# Patient Record
Sex: MALE | Race: WHITE | NOT HISPANIC OR LATINO | URBAN - METROPOLITAN AREA
[De-identification: names, ages, dates, MRNs, and addresses within clinical notes are randomized per-mention and may not be internally consistent; named-entity substitution may affect disease eponyms.]

---

## 2018-08-30 ENCOUNTER — INPATIENT (INPATIENT)
Facility: HOSPITAL | Age: 63
LOS: 1 days | Discharge: HOME HEALTH | DRG: 489 | End: 2018-09-01
Attending: ORTHOPAEDIC SURGERY | Admitting: ORTHOPAEDIC SURGERY
Payer: COMMERCIAL

## 2018-08-30 VITALS
TEMPERATURE: 98 F | OXYGEN SATURATION: 98 % | DIASTOLIC BLOOD PRESSURE: 81 MMHG | RESPIRATION RATE: 18 BRPM | HEART RATE: 57 BPM | SYSTOLIC BLOOD PRESSURE: 131 MMHG

## 2018-08-30 LAB
ALBUMIN SERPL ELPH-MCNC: 4.6 G/DL — SIGNIFICANT CHANGE UP (ref 3.4–5)
ALP SERPL-CCNC: 88 U/L — SIGNIFICANT CHANGE UP (ref 40–120)
ALT FLD-CCNC: 171 U/L — HIGH (ref 12–42)
ANION GAP SERPL CALC-SCNC: 8 MMOL/L — LOW (ref 9–16)
APTT BLD: 27.8 SEC — SIGNIFICANT CHANGE UP (ref 27.5–36.5)
AST SERPL-CCNC: 71 U/L — HIGH (ref 15–37)
BILIRUB SERPL-MCNC: 1.2 MG/DL — SIGNIFICANT CHANGE UP (ref 0.2–1.2)
BUN SERPL-MCNC: 32 MG/DL — HIGH (ref 7–23)
CALCIUM SERPL-MCNC: 9.4 MG/DL — SIGNIFICANT CHANGE UP (ref 8.5–10.5)
CHLORIDE SERPL-SCNC: 102 MMOL/L — SIGNIFICANT CHANGE UP (ref 96–108)
CO2 SERPL-SCNC: 26 MMOL/L — SIGNIFICANT CHANGE UP (ref 22–31)
CREAT SERPL-MCNC: 1.18 MG/DL — SIGNIFICANT CHANGE UP (ref 0.5–1.3)
GLUCOSE SERPL-MCNC: 103 MG/DL — HIGH (ref 70–99)
HCT VFR BLD CALC: 44.7 % — SIGNIFICANT CHANGE UP (ref 39–50)
HGB BLD-MCNC: 15.5 G/DL — SIGNIFICANT CHANGE UP (ref 13–17)
INR BLD: 1.08 — SIGNIFICANT CHANGE UP (ref 0.88–1.16)
MCHC RBC-ENTMCNC: 30.5 PG — SIGNIFICANT CHANGE UP (ref 27–34)
MCHC RBC-ENTMCNC: 34.7 G/DL — SIGNIFICANT CHANGE UP (ref 32–36)
MCV RBC AUTO: 87.8 FL — SIGNIFICANT CHANGE UP (ref 80–100)
PLATELET # BLD AUTO: 227 K/UL — SIGNIFICANT CHANGE UP (ref 150–400)
POTASSIUM SERPL-MCNC: 4.7 MMOL/L — SIGNIFICANT CHANGE UP (ref 3.5–5.3)
POTASSIUM SERPL-SCNC: 4.7 MMOL/L — SIGNIFICANT CHANGE UP (ref 3.5–5.3)
PROT SERPL-MCNC: 8.2 G/DL — SIGNIFICANT CHANGE UP (ref 6.4–8.2)
PROTHROM AB SERPL-ACNC: 11.9 SEC — SIGNIFICANT CHANGE UP (ref 9.8–12.7)
RBC # BLD: 5.09 M/UL — SIGNIFICANT CHANGE UP (ref 4.2–5.8)
RBC # FLD: 12 % — SIGNIFICANT CHANGE UP (ref 10.3–16.9)
SODIUM SERPL-SCNC: 136 MMOL/L — SIGNIFICANT CHANGE UP (ref 132–145)
WBC # BLD: 11.5 K/UL — HIGH (ref 3.8–10.5)
WBC # FLD AUTO: 11.5 K/UL — HIGH (ref 3.8–10.5)

## 2018-08-30 PROCEDURE — 73552 X-RAY EXAM OF FEMUR 2/>: CPT | Mod: 26,LT

## 2018-08-30 PROCEDURE — 73564 X-RAY EXAM KNEE 4 OR MORE: CPT | Mod: 26,LT

## 2018-08-30 PROCEDURE — 93010 ELECTROCARDIOGRAM REPORT: CPT

## 2018-08-30 PROCEDURE — 99285 EMERGENCY DEPT VISIT HI MDM: CPT | Mod: 25

## 2018-08-30 PROCEDURE — 99222 1ST HOSP IP/OBS MODERATE 55: CPT | Mod: 57

## 2018-08-30 PROCEDURE — 71045 X-RAY EXAM CHEST 1 VIEW: CPT | Mod: 26

## 2018-08-30 RX ORDER — BACLOFEN 100 %
10 POWDER (GRAM) MISCELLANEOUS ONCE
Qty: 0 | Refills: 0 | Status: COMPLETED | OUTPATIENT
Start: 2018-08-30 | End: 2018-08-30

## 2018-08-30 RX ORDER — OXYCODONE AND ACETAMINOPHEN 5; 325 MG/1; MG/1
1 TABLET ORAL ONCE
Qty: 0 | Refills: 0 | Status: DISCONTINUED | OUTPATIENT
Start: 2018-08-30 | End: 2018-08-30

## 2018-08-30 RX ORDER — IBUPROFEN 200 MG
600 TABLET ORAL ONCE
Qty: 0 | Refills: 0 | Status: COMPLETED | OUTPATIENT
Start: 2018-08-30 | End: 2018-08-30

## 2018-08-30 RX ADMIN — Medication 10 MILLIGRAM(S): at 21:09

## 2018-08-30 RX ADMIN — OXYCODONE AND ACETAMINOPHEN 1 TABLET(S): 5; 325 TABLET ORAL at 21:59

## 2018-08-30 RX ADMIN — Medication 600 MILLIGRAM(S): at 21:09

## 2018-08-30 NOTE — ED PROVIDER NOTE - OBJECTIVE STATEMENT
PMHX erythromycin allergy, depression, chronic R hip pain on meloxicam, GERD, presents with acute onset of L thigh pain while walking down a set of steps. denies fall, trauma. patient slumped against a wall and to the floor and has not been able to bear weight. incident occurred at 6pm this evening. denies paresthesias or focal weakness

## 2018-08-30 NOTE — ED ADULT NURSE NOTE - CHPI ED NUR SYMPTOMS NEG
no abrasion/no deformity/no tingling/no confusion/no numbness/no vomiting/no bleeding/no loss of consciousness/no fever

## 2018-08-30 NOTE — ED PROVIDER NOTE - PHYSICAL EXAMINATION
L thigh TTP over the distal anterior thigh, unable to extend at the knee, able to move all toes, DP pulses equal

## 2018-08-30 NOTE — ED PROVIDER NOTE - ATTENDING CONTRIBUTION TO CARE
Pt is a 64yo M with acute onset of L thigh pain while walking down a set of steps. denies fall, trauma.  PE - agree with PA exam as above.  C/W quad tendon rupture.   Ortho consulted.   Will admit to Dr. Owen - seen here at [facility].

## 2018-08-30 NOTE — ED ADULT NURSE NOTE - NSIMPLEMENTINTERV_GEN_ALL_ED
Implemented All Fall Risk Interventions:  [place] to call system. Call bell, personal items and telephone within reach. Instruct patient to call for assistance. Room bathroom lighting operational. Non-slip footwear when patient is off stretcher. Physically safe environment: no spills, clutter or unnecessary equipment. Stretcher in lowest position, wheels locked, appropriate side rails in place. Provide visual cue, wrist band, yellow gown, etc. Monitor gait and stability. Monitor for mental status changes and reorient to person, place, and time. Review medications for side effects contributing to fall risk. Reinforce activity limits and safety measures with patient and family.

## 2018-08-30 NOTE — ED PROVIDER NOTE - MEDICAL DECISION MAKING DETAILS
patient with L quadraceps tendon rupture, give baclofen, percocet, ibuprofen for pain, unable to tolerate knee immobilizer or weight bear. will check xray and consult ortho

## 2018-08-30 NOTE — ED PROVIDER NOTE - DIAGNOSTIC INTERPRETATION
xray femur 2v L: no fx, no dislocation, no soft tissue injury  xray knee 3v L: no fx, calcifications proximal to patella, no dislocation

## 2018-08-30 NOTE — ED ADULT NURSE NOTE - OBJECTIVE STATEMENT
Pt presents to ED c/o left thigh pain s/p mechanical fall down steps while moving, denies any LOC or use of blood thinners. Pt unable to bear weight, unable to flex or extend at knee without pain. Denies any numbness or tingling to extremity, +bounding pulses and good cap refill. No bruising, swelling or deformities noted.

## 2018-08-31 ENCOUNTER — APPOINTMENT (OUTPATIENT)
Dept: ORTHOPEDIC SURGERY | Facility: HOSPITAL | Age: 63
End: 2018-08-31

## 2018-08-31 ENCOUNTER — TRANSCRIPTION ENCOUNTER (OUTPATIENT)
Age: 63
End: 2018-08-31

## 2018-08-31 DIAGNOSIS — Z98.890 OTHER SPECIFIED POSTPROCEDURAL STATES: Chronic | ICD-10-CM

## 2018-08-31 PROBLEM — Z00.00 ENCOUNTER FOR PREVENTIVE HEALTH EXAMINATION: Status: ACTIVE | Noted: 2018-08-31

## 2018-08-31 LAB
ANION GAP SERPL CALC-SCNC: 12 MMOL/L — SIGNIFICANT CHANGE UP (ref 5–17)
APPEARANCE UR: CLEAR — SIGNIFICANT CHANGE UP
BASOPHILS NFR BLD AUTO: 0.4 % — SIGNIFICANT CHANGE UP (ref 0–2)
BILIRUB UR-MCNC: NEGATIVE — SIGNIFICANT CHANGE UP
BLD GP AB SCN SERPL QL: NEGATIVE — SIGNIFICANT CHANGE UP
BUN SERPL-MCNC: 28 MG/DL — HIGH (ref 7–23)
CALCIUM SERPL-MCNC: 9.6 MG/DL — SIGNIFICANT CHANGE UP (ref 8.4–10.5)
CHLORIDE SERPL-SCNC: 101 MMOL/L — SIGNIFICANT CHANGE UP (ref 96–108)
CO2 SERPL-SCNC: 26 MMOL/L — SIGNIFICANT CHANGE UP (ref 22–31)
COLOR SPEC: YELLOW — SIGNIFICANT CHANGE UP
CREAT SERPL-MCNC: 1.19 MG/DL — SIGNIFICANT CHANGE UP (ref 0.5–1.3)
DIFF PNL FLD: NEGATIVE — SIGNIFICANT CHANGE UP
EOSINOPHIL NFR BLD AUTO: 2.4 % — SIGNIFICANT CHANGE UP (ref 0–6)
GLUCOSE SERPL-MCNC: 103 MG/DL — HIGH (ref 70–99)
GLUCOSE UR QL: NEGATIVE — SIGNIFICANT CHANGE UP
HCT VFR BLD CALC: 43.4 % — SIGNIFICANT CHANGE UP (ref 39–50)
HGB BLD-MCNC: 14.7 G/DL — SIGNIFICANT CHANGE UP (ref 13–17)
KETONES UR-MCNC: ABNORMAL MG/DL
LEUKOCYTE ESTERASE UR-ACNC: NEGATIVE — SIGNIFICANT CHANGE UP
LYMPHOCYTES # BLD AUTO: 33.5 % — SIGNIFICANT CHANGE UP (ref 13–44)
MCHC RBC-ENTMCNC: 30 PG — SIGNIFICANT CHANGE UP (ref 27–34)
MCHC RBC-ENTMCNC: 33.9 G/DL — SIGNIFICANT CHANGE UP (ref 32–36)
MCV RBC AUTO: 88.6 FL — SIGNIFICANT CHANGE UP (ref 80–100)
MONOCYTES NFR BLD AUTO: 8.9 % — SIGNIFICANT CHANGE UP (ref 2–14)
NEUTROPHILS NFR BLD AUTO: 54.8 % — SIGNIFICANT CHANGE UP (ref 43–77)
NITRITE UR-MCNC: NEGATIVE — SIGNIFICANT CHANGE UP
PH UR: 6 — SIGNIFICANT CHANGE UP (ref 5–8)
PLATELET # BLD AUTO: 188 K/UL — SIGNIFICANT CHANGE UP (ref 150–400)
POTASSIUM SERPL-MCNC: 4.4 MMOL/L — SIGNIFICANT CHANGE UP (ref 3.5–5.3)
POTASSIUM SERPL-SCNC: 4.4 MMOL/L — SIGNIFICANT CHANGE UP (ref 3.5–5.3)
PROT UR-MCNC: NEGATIVE MG/DL — SIGNIFICANT CHANGE UP
RBC # BLD: 4.9 M/UL — SIGNIFICANT CHANGE UP (ref 4.2–5.8)
RBC # FLD: 12.7 % — SIGNIFICANT CHANGE UP (ref 10.3–16.9)
RH IG SCN BLD-IMP: POSITIVE — SIGNIFICANT CHANGE UP
SODIUM SERPL-SCNC: 139 MMOL/L — SIGNIFICANT CHANGE UP (ref 135–145)
SP GR SPEC: 1.02 — SIGNIFICANT CHANGE UP (ref 1–1.03)
UROBILINOGEN FLD QL: 0.2 E.U./DL — SIGNIFICANT CHANGE UP
WBC # BLD: 7.9 K/UL — SIGNIFICANT CHANGE UP (ref 3.8–10.5)
WBC # FLD AUTO: 7.9 K/UL — SIGNIFICANT CHANGE UP (ref 3.8–10.5)

## 2018-08-31 PROCEDURE — 27385 REPAIR OF THIGH MUSCLE: CPT | Mod: LT

## 2018-08-31 PROCEDURE — 99223 1ST HOSP IP/OBS HIGH 75: CPT

## 2018-08-31 RX ORDER — SODIUM CHLORIDE 9 MG/ML
1000 INJECTION, SOLUTION INTRAVENOUS
Qty: 0 | Refills: 0 | Status: DISCONTINUED | OUTPATIENT
Start: 2018-08-31 | End: 2018-09-01

## 2018-08-31 RX ORDER — HYDROMORPHONE HYDROCHLORIDE 2 MG/ML
0.5 INJECTION INTRAMUSCULAR; INTRAVENOUS; SUBCUTANEOUS
Qty: 0 | Refills: 0 | Status: DISCONTINUED | OUTPATIENT
Start: 2018-08-31 | End: 2018-09-01

## 2018-08-31 RX ORDER — MORPHINE SULFATE 50 MG/1
2 CAPSULE, EXTENDED RELEASE ORAL EVERY 4 HOURS
Qty: 0 | Refills: 0 | Status: DISCONTINUED | OUTPATIENT
Start: 2018-08-31 | End: 2018-08-31

## 2018-08-31 RX ORDER — SODIUM CHLORIDE 9 MG/ML
1000 INJECTION, SOLUTION INTRAVENOUS
Qty: 0 | Refills: 0 | Status: DISCONTINUED | OUTPATIENT
Start: 2018-08-31 | End: 2018-08-31

## 2018-08-31 RX ORDER — ASPIRIN/CALCIUM CARB/MAGNESIUM 324 MG
325 TABLET ORAL
Qty: 0 | Refills: 0 | Status: DISCONTINUED | OUTPATIENT
Start: 2018-08-31 | End: 2018-09-01

## 2018-08-31 RX ORDER — CEFAZOLIN SODIUM 1 G
2000 VIAL (EA) INJECTION EVERY 8 HOURS
Qty: 0 | Refills: 0 | Status: COMPLETED | OUTPATIENT
Start: 2018-08-31 | End: 2018-08-31

## 2018-08-31 RX ORDER — DOCUSATE SODIUM 100 MG
1 CAPSULE ORAL
Qty: 10 | Refills: 0 | OUTPATIENT
Start: 2018-08-31 | End: 2018-09-09

## 2018-08-31 RX ORDER — ONDANSETRON 8 MG/1
4 TABLET, FILM COATED ORAL EVERY 8 HOURS
Qty: 0 | Refills: 0 | Status: DISCONTINUED | OUTPATIENT
Start: 2018-08-31 | End: 2018-08-31

## 2018-08-31 RX ORDER — BUPROPION HYDROCHLORIDE 150 MG/1
150 TABLET, EXTENDED RELEASE ORAL DAILY
Qty: 0 | Refills: 0 | Status: DISCONTINUED | OUTPATIENT
Start: 2018-08-31 | End: 2018-09-01

## 2018-08-31 RX ORDER — ASPIRIN/CALCIUM CARB/MAGNESIUM 324 MG
1 TABLET ORAL
Qty: 60 | Refills: 0 | OUTPATIENT
Start: 2018-08-31 | End: 2018-09-29

## 2018-08-31 RX ORDER — ACETAMINOPHEN 500 MG
1000 TABLET ORAL ONCE
Qty: 0 | Refills: 0 | Status: COMPLETED | OUTPATIENT
Start: 2018-08-31 | End: 2018-08-31

## 2018-08-31 RX ORDER — ONDANSETRON 8 MG/1
4 TABLET, FILM COATED ORAL EVERY 4 HOURS
Qty: 0 | Refills: 0 | Status: DISCONTINUED | OUTPATIENT
Start: 2018-08-31 | End: 2018-09-01

## 2018-08-31 RX ORDER — OXYCODONE HYDROCHLORIDE 5 MG/1
5 TABLET ORAL EVERY 4 HOURS
Qty: 0 | Refills: 0 | Status: DISCONTINUED | OUTPATIENT
Start: 2018-08-31 | End: 2018-09-01

## 2018-08-31 RX ORDER — HYDROMORPHONE HYDROCHLORIDE 2 MG/ML
0.5 INJECTION INTRAMUSCULAR; INTRAVENOUS; SUBCUTANEOUS ONCE
Qty: 0 | Refills: 0 | Status: DISCONTINUED | OUTPATIENT
Start: 2018-08-31 | End: 2018-08-31

## 2018-08-31 RX ORDER — SERTRALINE 25 MG/1
50 TABLET, FILM COATED ORAL DAILY
Qty: 0 | Refills: 0 | Status: DISCONTINUED | OUTPATIENT
Start: 2018-08-31 | End: 2018-09-01

## 2018-08-31 RX ORDER — DOCUSATE SODIUM 100 MG
100 CAPSULE ORAL THREE TIMES A DAY
Qty: 0 | Refills: 0 | Status: DISCONTINUED | OUTPATIENT
Start: 2018-08-31 | End: 2018-09-01

## 2018-08-31 RX ORDER — CEFAZOLIN SODIUM 1 G
2000 VIAL (EA) INJECTION EVERY 8 HOURS
Qty: 0 | Refills: 0 | Status: DISCONTINUED | OUTPATIENT
Start: 2018-08-31 | End: 2018-08-31

## 2018-08-31 RX ORDER — OXYCODONE HYDROCHLORIDE 5 MG/1
10 TABLET ORAL EVERY 4 HOURS
Qty: 0 | Refills: 0 | Status: DISCONTINUED | OUTPATIENT
Start: 2018-08-31 | End: 2018-09-01

## 2018-08-31 RX ADMIN — Medication 100 MILLIGRAM(S): at 16:09

## 2018-08-31 RX ADMIN — HYDROMORPHONE HYDROCHLORIDE 0.5 MILLIGRAM(S): 2 INJECTION INTRAMUSCULAR; INTRAVENOUS; SUBCUTANEOUS at 14:15

## 2018-08-31 RX ADMIN — HYDROMORPHONE HYDROCHLORIDE 0.5 MILLIGRAM(S): 2 INJECTION INTRAMUSCULAR; INTRAVENOUS; SUBCUTANEOUS at 18:25

## 2018-08-31 RX ADMIN — OXYCODONE HYDROCHLORIDE 10 MILLIGRAM(S): 5 TABLET ORAL at 23:12

## 2018-08-31 RX ADMIN — HYDROMORPHONE HYDROCHLORIDE 0.5 MILLIGRAM(S): 2 INJECTION INTRAMUSCULAR; INTRAVENOUS; SUBCUTANEOUS at 14:00

## 2018-08-31 RX ADMIN — Medication 2000 MILLIGRAM(S): at 16:09

## 2018-08-31 RX ADMIN — OXYCODONE HYDROCHLORIDE 10 MILLIGRAM(S): 5 TABLET ORAL at 14:15

## 2018-08-31 RX ADMIN — OXYCODONE HYDROCHLORIDE 5 MILLIGRAM(S): 5 TABLET ORAL at 20:35

## 2018-08-31 RX ADMIN — HYDROMORPHONE HYDROCHLORIDE 0.5 MILLIGRAM(S): 2 INJECTION INTRAMUSCULAR; INTRAVENOUS; SUBCUTANEOUS at 22:25

## 2018-08-31 RX ADMIN — Medication 2000 MILLIGRAM(S): at 23:15

## 2018-08-31 RX ADMIN — Medication 100 MILLIGRAM(S): at 05:36

## 2018-08-31 RX ADMIN — ONDANSETRON 4 MILLIGRAM(S): 8 TABLET, FILM COATED ORAL at 17:27

## 2018-08-31 RX ADMIN — HYDROMORPHONE HYDROCHLORIDE 0.5 MILLIGRAM(S): 2 INJECTION INTRAMUSCULAR; INTRAVENOUS; SUBCUTANEOUS at 14:35

## 2018-08-31 RX ADMIN — OXYCODONE HYDROCHLORIDE 5 MILLIGRAM(S): 5 TABLET ORAL at 19:36

## 2018-08-31 RX ADMIN — Medication 100 MILLIGRAM(S): at 22:08

## 2018-08-31 RX ADMIN — Medication 400 MILLIGRAM(S): at 23:45

## 2018-08-31 RX ADMIN — MORPHINE SULFATE 2 MILLIGRAM(S): 50 CAPSULE, EXTENDED RELEASE ORAL at 15:00

## 2018-08-31 RX ADMIN — HYDROMORPHONE HYDROCHLORIDE 0.5 MILLIGRAM(S): 2 INJECTION INTRAMUSCULAR; INTRAVENOUS; SUBCUTANEOUS at 14:45

## 2018-08-31 RX ADMIN — SODIUM CHLORIDE 80 MILLILITER(S): 9 INJECTION, SOLUTION INTRAVENOUS at 01:44

## 2018-08-31 RX ADMIN — HYDROMORPHONE HYDROCHLORIDE 0.5 MILLIGRAM(S): 2 INJECTION INTRAMUSCULAR; INTRAVENOUS; SUBCUTANEOUS at 22:10

## 2018-08-31 RX ADMIN — OXYCODONE HYDROCHLORIDE 10 MILLIGRAM(S): 5 TABLET ORAL at 23:45

## 2018-08-31 RX ADMIN — SERTRALINE 50 MILLIGRAM(S): 25 TABLET, FILM COATED ORAL at 05:36

## 2018-08-31 RX ADMIN — ONDANSETRON 4 MILLIGRAM(S): 8 TABLET, FILM COATED ORAL at 23:20

## 2018-08-31 RX ADMIN — HYDROMORPHONE HYDROCHLORIDE 0.5 MILLIGRAM(S): 2 INJECTION INTRAMUSCULAR; INTRAVENOUS; SUBCUTANEOUS at 14:20

## 2018-08-31 RX ADMIN — HYDROMORPHONE HYDROCHLORIDE 0.5 MILLIGRAM(S): 2 INJECTION INTRAMUSCULAR; INTRAVENOUS; SUBCUTANEOUS at 17:27

## 2018-08-31 RX ADMIN — MORPHINE SULFATE 2 MILLIGRAM(S): 50 CAPSULE, EXTENDED RELEASE ORAL at 16:00

## 2018-08-31 RX ADMIN — BUPROPION HYDROCHLORIDE 150 MILLIGRAM(S): 150 TABLET, EXTENDED RELEASE ORAL at 05:36

## 2018-08-31 NOTE — PHYSICAL THERAPY INITIAL EVALUATION ADULT - PLANNED THERAPY INTERVENTIONS, PT EVAL
bed mobility training/gait training/balance training/transfer training/postural re-education/strengthening/ROM

## 2018-08-31 NOTE — DISCHARGE NOTE ADULT - MEDICATION SUMMARY - MEDICATIONS TO TAKE
I will START or STAY ON the medications listed below when I get home from the hospital:    aspirin 325 mg oral tablet  -- 1 tab(s) by mouth 2 times a day   -- Take with food or milk.    -- Indication: For Tendon rupture, nontraumatic    oxyCODONE-acetaminophen 5 mg-325 mg oral tablet  -- 1 tab(s) by mouth 4 times a day, As Needed for Severe Pain MDD:4  -- Caution federal law prohibits the transfer of this drug to any person other  than the person for whom it was prescribed.  May cause drowsiness.  Alcohol may intensify this effect.  Use care when operating dangerous machinery.  This prescription cannot be refilled.  This product contains acetaminophen.  Do not use  with any other product containing acetaminophen to prevent possible liver damage.  Using more of this medication than prescribed may cause serious breathing problems.    -- Indication: For Tendon rupture, nontraumatic    sertraline 50 mg oral tablet  -- 1 tab(s) by mouth once a day  -- Indication: For Home med    famotidine 10 mg oral tablet  -- 10 milligram(s) by mouth 2 times a day  -- Indication: For Home med    Colace 100 mg oral capsule  -- 1 cap(s) by mouth once a day, As Needed for constipation  -- Medication should be taken with plenty of water.    -- Indication: For Tendon rupture, nontraumatic    Wellbutrin  mg/24 hours oral tablet, extended release  -- 1 tab(s) by mouth every 24 hours  -- Indication: For Home med

## 2018-08-31 NOTE — H&P ADULT - HISTORY OF PRESENT ILLNESS
Pt Name: LEON WEIL  MRN: [id number]    63M [facility] depression presents with L quad tendon rupture s/p mechanical fall. Patient was carrying furniture down stairs and fell on his left knee, complained of cramping afterwards and was unable to bear weight. Presented to [facility] and was evaluated by Dr. Owen and placed in a knee immobilizer, transferred to [facility]. Denies any other trauma, LOC, head strike, numbness/tingling.    ROS is otherwise negative.  PAST MEDICAL & SURGICAL HISTORY:  No pertinent past medical history  H/O inguinal hernia repair  H/O arthroscopy of right knee      Allergies: NKDA    Medications:  buPROPion XL . 150 milliGRAM(s) Oral daily  docusate sodium 100 milliGRAM(s) Oral three times a day  lactated ringers. 1000 milliLiter(s) IV Continuous <Continuous>  morphine  - Injectable 2 milliGRAM(s) IV Push every 4 hours PRN  oxyCODONE    IR 10 milliGRAM(s) Oral every 4 hours PRN  oxyCODONE    IR 5 milliGRAM(s) Oral every 4 hours PRN  sertraline 50 milliGRAM(s) Oral daily      Social History:  Ambulation: Walking independently    PHYSICAL EXAM:    T(C): 36.7 (08-31-18 @ 04:30), Max: 36.7 (08-30-18 @ 19:13)  HR: 50 (08-31-18 @ 04:30) (50 - 57)  BP: 153/83 (08-31-18 @ 04:30) (131/81 - 153/83)  RR: 16 (08-31-18 @ 04:30) (14 - 18)  SpO2: 99% (08-31-18 @ 04:30) (97% - 99%)  Wt(kg): --    Gen: well developed, well nourished, comfortable  Affected extremity: LLE  in knee immobilizer  no open skin breaks, lacerations, open wounds       Motor: firing GS/TA/EHL      Sensation: SILT sp/dp/camejo/saph/t  unable to straight leg raise      wwp <2 DP 2+     Labs:                        15.5   11.5  )-----------( 227      ( 30 Aug 2018 22:50 )             44.7     08-30    136  |  102  |  32<H>  ----------------------------<  103<H>  4.7   |  26  |  1.18    Ca    9.4      30 Aug 2018 22:50    TPro  8.2  /  Alb  4.6  /  TBili  1.2  /  DBili  x   /  AST  71<H>  /  ALT  171<H>  /  AlkPhos  88  08-30      A/P  Pt is a 64yo Male with L quad tendon rupture s/p mechanical fall  NPO  IVF  Pain control  Hold anticoagulation for OR  Labs  UA  type and screen  CXR   EKG  Medical Clearance with:   CLAY nasal swab  Consented for L quad tendon repair  d/w attending on call Dr. Owen Pt Name: LEON WEIL  MRN: [id number]    63M [facility] depression presents with L quad tendon rupture s/p mechanical fall. Patient was carrying furniture down stairs and fell on his left knee, complained of cramping afterwards and was unable to bear weight. Presented to [facility] and was evaluated by Dr. Owen and placed in a knee immobilizer, transferred to [facility]. Denies any other trauma, LOC, head strike, numbness/tingling. Patient was unable to clear discharge from Emergency room with crutches due to ambulatory dysfunction and fall risk. He was unsafe to be released home and therefore was admitted for operative stabilization and PT evaluation    ROS is otherwise negative.  PAST MEDICAL & SURGICAL HISTORY: Right knee meniscectomy x2, H/O inguinal hernia repair  Past medical history: Depression, GERD, HTN  Fam Hx: Denies any significant cardiopulmonary disease  Allergies: NKDA    Medications:  buPROPion XL . 150 milliGRAM(s) Oral daily  docusate sodium 100 milliGRAM(s) Oral three times a day  lactated ringers. 1000 milliLiter(s) IV Continuous <Continuous>  morphine  - Injectable 2 milliGRAM(s) IV Push every 4 hours PRN  oxyCODONE    IR 10 milliGRAM(s) Oral every 4 hours PRN  oxyCODONE    IR 5 milliGRAM(s) Oral every 4 hours PRN  sertraline 50 milliGRAM(s) Oral daily      Social History:  Ambulation: Walking independently, non smoker, occ etoh, denies illicits    PHYSICAL EXAM:    T(C): 36.7 (08-31-18 @ 04:30), Max: 36.7 (08-30-18 @ 19:13)  HR: 50 (08-31-18 @ 04:30) (50 - 57)  BP: 153/83 (08-31-18 @ 04:30) (131/81 - 153/83)  RR: 16 (08-31-18 @ 04:30) (14 - 18)  SpO2: 99% (08-31-18 @ 04:30) (97% - 99%)  Wt(kg): --    Gen: well developed, well nourished, comfortable  Affected extremity: LLE  in knee immobilizer  no open skin breaks, lacerations, open wounds       Motor: firing GS/TA/EHL      Sensation: SILT sp/dp/camejo/saph/t  unable to straight leg raise      wwp <2 DP 2+     Labs:                        15.5   11.5  )-----------( 227      ( 30 Aug 2018 22:50 )             44.7     08-30    136  |  102  |  32<H>  ----------------------------<  103<H>  4.7   |  26  |  1.18    Ca    9.4      30 Aug 2018 22:50    TPro  8.2  /  Alb  4.6  /  TBili  1.2  /  DBili  x   /  AST  71<H>  /  ALT  171<H>  /  AlkPhos  88  08-30      A/P  Pt is a 62yo Male with L quad tendon rupture s/p mechanical fall. Unable to be discharged for outpatient surgery due to fall risk.   NPO  IVF  Pain control  Hold anticoagulation for OR  Labs  UA  type and screen  CXR   EKG  Medical Clearance pending  MSRA nasal swab  Consented for L quad tendon repair  d/w attending on call Dr. Owen

## 2018-08-31 NOTE — DISCHARGE NOTE ADULT - CARE PROVIDERS DIRECT ADDRESSES
[email or web address]

## 2018-08-31 NOTE — DISCHARGE NOTE ADULT - ADDITIONAL INSTRUCTIONS
No strenuous activity, heavy lifting, driving, tub bathing, or returning to work until cleared by MD.  You may shower--dressing is waterproof.  Remove dressing after post op day 5, then leave incision open to air.  Follow up with Dr. Owen in his office in 2 weeks from surgery.  Any staples/sutures will be removed in 2 weeks.  If you don't have a bowel movement by post op day 3, then take Milk of Magnesia (over the counter).  If no bowel movement by at least post op day 5, then use a Dulcolax suppository (over the counter) and/or a Fleets enema--if still no bowel movement, call your MD.  Contact your doctor if you experience: fever greater than 101.5, chills, chest pain, difficulty breathing, bleeding, redness or heat around the incision.    Please follow up with your primary care provider. No strenuous activity, heavy lifting, driving, tub bathing, or returning to work until cleared by MD.  You may shower, no soaking  Remove dressing after post op day 7, then leave incision open to air.  You are weight bearing as tolerated of the left lower extremity with your hinged knee brace locked in extension.  No left knee flexion.    Follow up with Dr. Owen in his office in 2 weeks from surgery.  Any staples/sutures will be removed in 2 weeks.  If you don't have a bowel movement by post op day 3, then take Milk of Magnesia (over the counter).  If no bowel movement by at least post op day 5, then use a Dulcolax suppository (over the counter) and/or a Fleets enema--if still no bowel movement, call your MD.  Contact your doctor if you experience: fever greater than 101.5, chills, chest pain, difficulty breathing, bleeding, redness or heat around the incision.    Please follow up with your primary care provider.

## 2018-08-31 NOTE — PHYSICAL THERAPY INITIAL EVALUATION ADULT - ADDITIONAL COMMENTS
Patient lives in an private house with his wife and children in [place] with 2 steps with handrails to enter and another flight of 10 steps to reach second floor where bedrooms are. Prior to admission, patient was independent for all functional mobility without assistive device.

## 2018-08-31 NOTE — DISCHARGE NOTE ADULT - PATIENT PORTAL LINK FT
You can access the [facility] Patient Portal, offered by [facility], by registering with the following website: [email or web address]

## 2018-08-31 NOTE — PROVIDER CONTACT NOTE (OTHER) - ACTION/TREATMENT ORDERED:
Said MD instructed to administer Oxicodone PO early. He will reportedly order Tylenol IV as well and increase Zofran PRN frequency.

## 2018-08-31 NOTE — CONSULT NOTE ADULT - SUBJECTIVE AND OBJECTIVE BOX
INTERNAL MEDICINE SERVICE INITIAL CONSULT NOTE    HPI:  64yo M with PMHx of Depression, OA of R hip, GERD, MAGALYS (uses CPAP) presenting to the ED     presents with acute onset of L thigh pain while walking down a set of steps. denies fall, trauma. patient slumped against a wall and to the floor and has not been able to bear weight. incident occurred at 6pm this evening. denies paresthesias or focal weakness    Patient was helping his daughter move today, patient fell from 4-6 steps and fell on the side       > 4 METS  but less than 10    Denies rxn to anesthesia.      REVIEW OF SYSTEMS:    CONSTITUTIONAL: No weakness, fevers or chills  EYES/ENT: No visual changes;  No vertigo or throat pain   NECK: No pain or stiffness  RESPIRATORY: +HOFF walking up 4 flight of stairs, No cough, wheezing, hemoptysis; No shortness of breath  CARDIOVASCULAR: No chest pain or palpitations  GASTROINTESTINAL: No abdominal or epigastric pain. No nausea, vomiting, or hematemesis; No diarrhea or constipation. No melena or hematochezia.  GENITOURINARY: No dysuria, frequency or hematuria  NEUROLOGICAL: No numbness or weakness  SKIN: No itching, rashes    ADDITIONAL MEDICINE HPI:    REVIEW OF SYSTEMS:   Otherwise negative except as specified in HPI    PAST MEDICAL HISTORY:   Depression  OA of R hip  GERD  MAGALYS (uses CPAP)    PAST SURGICAL HISTORY:  Meniscal repar x2 of R knee   Abdominal Hernia repair  Inguinal hernia repair     FAMILY HISTORY:  FHx of CAD - father needed a bypass and  of MI in the 80s   FHx of Lung cancer - mother and  at 81     SOCIAL HISTORY:  Tobacco use: Never smoker   EtOH use: 1-2 beers/nightly   Illicit drug use: No drug use   Works as  for people with special needs, 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 1000 milliLiter(s) (80 mL/Hr) IV Continuous <Continuous>    MEDICATIONS  (PRN):  morphine  - Injectable 2 milliGRAM(s) IV Push every 4 hours PRN breakthrough pain  oxyCODONE    IR 10 milliGRAM(s) Oral every 4 hours PRN Severe Pain (7 - 10)  oxyCODONE    IR 5 milliGRAM(s) Oral every 4 hours PRN Moderate Pain (4 - 6)      ALLERGIES:  Allergies  erythromycin (Hives)    Intolerances    VITAL SIGNS:  Vital Signs Last 24 Hrs  T(C): 36.7 (31 Aug 2018 01:04), Max: 36.7 (30 Aug 2018 19:13)  T(F): 98 (31 Aug 2018 01:04), Max: 98 (30 Aug 2018 19:13)  HR: 52 (31 Aug 2018 01:04) (52 - 57)  BP: 132/72 (31 Aug 2018 01:04) (131/81 - 132/72)  BP(mean): --  RR: 14 (31 Aug 2018 01:) (14 - 18)  SpO2: 97% (31 Aug 2018 01:) (97% - 98%)      PHYSICAL EXAM:  Constitutional: WDWN resting comfortably in bed; NAD  Head: NC/AT  Eyes: PERRL, EOMI, anicteric sclera  ENT: no nasal discharge; uvula midline, no oropharyngeal erythema or exudates; MMM  Neck: supple; no JVD or thyromegaly  Respiratory: CTA B/L; no W/R/R, no retractions  Cardiac: +S1/S2; RRR; no M/R/G; PMI non-displaced  Gastrointestinal: abdomen soft, NT/ND; no rebound or guarding; +BSx4  Genitourinary: normal external genitalia  Back: spine midline, no bony tenderness or step-offs; no CVAT B/L  Extremities: WWP, no clubbing or cyanosis; no peripheral edema  Musculoskeletal: NROM x4; no joint swelling, tenderness or erythema  Vascular: 2+ radial, femoral, DP/PT pulses B/L  Dermatologic: skin warm, dry and intact; no rashes, wounds, or scars  Lymphatic: no submandibular or cervical LAD  Neurologic: AAOx3; CNII-XII grossly intact; no focal deficits  Psychiatric: affect and characteristics of appearance, verbalizations, behaviors are appropriate    LABS:                        15.5   11.5  )-----------( 227      ( 30 Aug 2018 22:50 )             44.7     08-30    136  |  102  |  32<H>  ----------------------------<  103<H>  4.7   |  26  |  1.18    Ca    9.4      30 Aug 2018 22:50    TPro  8.2  /  Alb  4.6  /  TBili  1.2  /  DBili  x   /  AST  71<H>  /  ALT  171<H>  /  AlkPhos  88  08-30    PT/INR - ( 30 Aug 2018 22:50 )   PT: 11.9 sec;   INR: 1.08          PTT - ( 30 Aug 2018 22:50 )  PTT:27.8 sec      RADIOLOGY & ADDITIONAL TESTS: INTERNAL MEDICINE SERVICE INITIAL CONSULT NOTE    HPI:  64yo M with PMHx of Depression, OA of R hip, GERD, MAGALYS (uses CPAP) presenting to the ED s/p mechanical fall. Patient was helping his daughter move today, then fell from 4-6 steps. He fell on the side with no LOC. Describes acute left thigh pain and was unable to bear weight on on his left leg and unable to flex of extend his knee without significant pain. Denies any numbness or tingling down either extremities. Patient states that over the past few weeks he has been moving into a new home, helping his daughter move yesterday, with decreased sleep. Patient has > 4 METS but less than 10 - Patient is a  and participates in moderate activity - doubles tennis and able to walk several blocks without any shortness of breath, chest pain, palpitations. Does complain of mild HOFF walking up 4 flight of stairs while lifting objects.     Patient had several surgeries prior and states never had rxn to anesthesia.  Patient denies any family hx of reaction to anesthesia.       REVIEW OF SYSTEMS:  CONSTITUTIONAL: No weakness, fevers or chills  EYES/ENT: No visual changes;  No vertigo or throat pain   NECK: No pain or stiffness  RESPIRATORY: +HOFF walking up 4 flight of stairs, No cough, wheezing, hemoptysis; No shortness of breath  CARDIOVASCULAR: No chest pain or palpitations  GASTROINTESTINAL: No abdominal or epigastric pain. No nausea, vomiting, or hematemesis; No diarrhea or constipation. No melena or hematochezia.  GENITOURINARY: No dysuria, frequency or hematuria  NEUROLOGICAL: No numbness or weakness  SKIN: No itching, rashes    ADDITIONAL MEDICINE HPI:    REVIEW OF SYSTEMS:   Otherwise negative except as specified in HPI    PAST MEDICAL HISTORY:   Depression  OA of R hip  GERD  MAGALYS (uses CPAP)    PAST SURGICAL HISTORY:  Meniscal repar x2 of R knee   Abdominal Hernia repair  Inguinal hernia repair     FAMILY HISTORY:  FHx of CAD - father needed a bypass and  of MI in the 80s   FHx of Lung cancer - mother and  at 81     SOCIAL HISTORY:  Tobacco use: Never smoker   EtOH use: 1-2 beers/nightly   Illicit drug use: No drug use   Works as  for people with special needs, 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 1000 milliLiter(s) (80 mL/Hr) IV Continuous <Continuous>    MEDICATIONS  (PRN):  morphine  - Injectable 2 milliGRAM(s) IV Push every 4 hours PRN breakthrough pain  oxyCODONE    IR 10 milliGRAM(s) Oral every 4 hours PRN Severe Pain (7 - 10)  oxyCODONE    IR 5 milliGRAM(s) Oral every 4 hours PRN Moderate Pain (4 - 6)      ALLERGIES:  Allergies  erythromycin (Hives)    Intolerances    VITAL SIGNS:  Vital Signs Last 24 Hrs  T(C): 36.7 (31 Aug 2018 01:04), Max: 36.7 (30 Aug 2018 19:13)  T(F): 98 (31 Aug 2018 01:04), Max: 98 (30 Aug 2018 19:13)  HR: 52 (31 Aug 2018 01:04) (52 - 57)  BP: 132/72 (31 Aug 2018 01:04) (131/81 - 132/72)  BP(mean): --  RR: 14 (31 Aug 2018 01:04) (14 - 18)  SpO2: 97% (31 Aug 2018 01:04) (97% - 98%)      PHYSICAL EXAM:  Constitutional: WDWN resting comfortably in bed; NAD  Head: NC/AT  Eyes: PERRL, EOMI, mildly erythematous R sclera   ENT: no nasal discharge; uvula midline, no oropharyngeal erythema or exudates; MMM  Neck: supple; no JVD or thyromegaly  Respiratory: CTA B/L; no W/R/R, no retractions  Cardiac: +S1/S2; RRR; no murmurs   Gastrointestinal: abdomen soft, mildly distended, nontender, no rebound or guarding; hyperactive BS  Back: spine midline, no bony tenderness or step-offs; no CVAT B/L  Extremities: L lower extremity in immobilizer, sensation intact, unable to assess strength, R lower extremity 5/5 strength, sensation intact, WWP, no clubbing or cyanosis; no peripheral edema  Musculoskeletal: NROM x4; no joint swelling, tenderness or erythema  Vascular: 2+ radial, DP/PT pulses B/L  Dermatologic: skin warm, dry and intact; no rashes, wounds, or scars  Lymphatic: no submandibular or cervical LAD  Neurologic: AAOx3; CNII-XII grossly intact; no focal deficits  Psychiatric: affect and characteristics of appearance, verbalizations, behaviors are appropriate    LABS:                        15.5   11.5  )-----------( 227      ( 30 Aug 2018 22:50 )             44.7         136  |  102  |  32<H>  ----------------------------<  103<H>  4.7   |  26  |  1.18    Ca    9.4      30 Aug 2018 22:50    TPro  8.2  /  Alb  4.6  /  TBili  1.2  /  DBili  x   /  AST  71<H>  /  ALT  171<H>  /  AlkPhos  88      PT/INR - ( 30 Aug 2018 22:50 )   PT: 11.9 sec;   INR: 1.08          PTT - ( 30 Aug 2018 22:50 )  PTT:27.8 sec      RADIOLOGY & ADDITIONAL TESTS:     EKG: Sinus bradycardia, otherwise nl, no ST elevations or T wave inversions     CXR: mild venous congestions, hazy opacification in the apical regions b/l     Per ED wet reads:   Xray femur 2v L: no fx, no dislocation, no soft tissue injury  Xray knee 3v L: no fx, calcifications proximal to patella, no dislocation INTERNAL MEDICINE SERVICE INITIAL CONSULT NOTE    HPI:  64yo M with PMHx of Depression, OA of R hip, GERD, MAGALYS (uses CPAP) presenting to the ED s/p mechanical fall. Patient was helping his daughter move today, then fell from 4-6 steps. He fell on the side with no LOC. Describes acute left thigh pain and was unable to bear weight on on his left leg and unable to flex of extend his knee without significant pain. Denies any numbness or tingling down either extremities. Patient states that over the past few weeks he has been moving into a new home, helping his daughter move yesterday, with decreased sleep. Prior to fall patient with > 4 METS but less than 10 - Patient is a  and participates in moderate activity - doubles tennis and able to walk several blocks without any shortness of breath, chest pain, palpitations. Does complain of mild HOFF walking up 4 flight of stairs while lifting objects.     Patient never had cardiac work-up prior, with stress test or echocardiogram. No signs of cardiac ischemia or heart failure.     Patient had several surgeries prior and states never had rxn to anesthesia.  Patient denies any family hx of reaction to anesthesia.       REVIEW OF SYSTEMS:  CONSTITUTIONAL: No weakness, fevers or chills  EYES/ENT: No visual changes;  No vertigo or throat pain   NECK: No pain or stiffness  RESPIRATORY: +HOFF walking up 4 flight of stairs, No cough, wheezing, hemoptysis; No shortness of breath  CARDIOVASCULAR: No chest pain or palpitations  GASTROINTESTINAL: No abdominal or epigastric pain. No nausea, vomiting, or hematemesis; No diarrhea or constipation. No melena or hematochezia.  GENITOURINARY: No dysuria, frequency or hematuria  NEUROLOGICAL: No numbness or weakness  SKIN: No itching, rashes    ADDITIONAL MEDICINE HPI:    REVIEW OF SYSTEMS:   Otherwise negative except as specified in HPI    PAST MEDICAL HISTORY:   Depression  OA of R hip  GERD  MAGALYS (uses CPAP)    PAST SURGICAL HISTORY:  Meniscal repar x2 of R knee   Abdominal Hernia repair  Inguinal hernia repair     FAMILY HISTORY:  FHx of CAD - father needed a bypass and  of MI in the 80s   FHx of Lung cancer - mother and  at 81     SOCIAL HISTORY:  Tobacco use: Never smoker   EtOH use: 1-2 beers/nightly   Illicit drug use: No drug use   Works as  for people with special needs, 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 1000 milliLiter(s) (80 mL/Hr) IV Continuous <Continuous>    MEDICATIONS  (PRN):  morphine  - Injectable 2 milliGRAM(s) IV Push every 4 hours PRN breakthrough pain  oxyCODONE    IR 10 milliGRAM(s) Oral every 4 hours PRN Severe Pain (7 - 10)  oxyCODONE    IR 5 milliGRAM(s) Oral every 4 hours PRN Moderate Pain (4 - 6)      ALLERGIES:  Allergies  erythromycin (Hives)    Intolerances    VITAL SIGNS:  Vital Signs Last 24 Hrs  T(C): 36.7 (31 Aug 2018 01:04), Max: 36.7 (30 Aug 2018 19:13)  T(F): 98 (31 Aug 2018 01:04), Max: 98 (30 Aug 2018 19:13)  HR: 52 (31 Aug 2018 01:04) (52 - 57)  BP: 132/72 (31 Aug 2018 01:04) (131/81 - 132/72)  BP(mean): --  RR: 14 (31 Aug 2018 01:04) (14 - 18)  SpO2: 97% (31 Aug 2018 01:04) (97% - 98%)      PHYSICAL EXAM:  Constitutional: WDWN resting comfortably in bed; NAD  Head: NC/AT  Eyes: PERRL, EOMI, mildly erythematous R sclera   ENT: no nasal discharge; uvula midline, no oropharyngeal erythema or exudates; MMM  Neck: supple; no JVD or thyromegaly  Respiratory: CTA B/L; no W/R/R, no retractions  Cardiac: +S1/S2; RRR; no murmurs   Gastrointestinal: abdomen soft, mildly distended, nontender, no rebound or guarding; hyperactive BS  Back: spine midline, no bony tenderness or step-offs; no CVAT B/L  Extremities: L lower extremity in immobilizer, sensation intact, unable to assess strength, R lower extremity 5/5 strength, sensation intact, WWP, no clubbing or cyanosis; no peripheral edema  Musculoskeletal: NROM x4; no joint swelling, tenderness or erythema  Vascular: 2+ radial, DP/PT pulses B/L  Dermatologic: skin warm, dry and intact; no rashes, wounds, or scars  Lymphatic: no submandibular or cervical LAD  Neurologic: AAOx3; CNII-XII grossly intact; no focal deficits  Psychiatric: affect and characteristics of appearance, verbalizations, behaviors are appropriate    LABS:                        15.5   11.5  )-----------( 227      ( 30 Aug 2018 22:50 )             44.7         136  |  102  |  32<H>  ----------------------------<  103<H>  4.7   |  26  |  1.18    Ca    9.4      30 Aug 2018 22:50    TPro  8.2  /  Alb  4.6  /  TBili  1.2  /  DBili  x   /  AST  71<H>  /  ALT  171<H>  /  AlkPhos  88  -    PT/INR - ( 30 Aug 2018 22:50 )   PT: 11.9 sec;   INR: 1.08          PTT - ( 30 Aug 2018 22:50 )  PTT:27.8 sec      RADIOLOGY & ADDITIONAL TESTS:     EKG: Sinus bradycardia, otherwise nl, no ST elevations or T wave inversions     CXR: mild venous congestions, hazy opacification in the apical regions b/l?    Per ED wet reads:   Xray femur 2v L: no fx, no dislocation, no soft tissue injury  Xray knee 3v L: no fx, calcifications proximal to patella, no dislocation INTERNAL MEDICINE SERVICE INITIAL CONSULT NOTE    HPI:  64yo M with PMHx of Depression, OA of R hip, GERD, MAGALYS (uses CPAP) presenting to the ED s/p mechanical fall. Patient was helping his daughter move today, then fell from 4-6 steps. He fell on the side with no LOC. Describes acute left thigh pain and was unable to bear weight on on his left leg and unable to flex of extend his knee without significant pain. Denies any numbness or tingling down either extremities. Patient states that over the past few weeks he has been moving into a new home, helping his daughter move yesterday, with decreased sleep. Prior to fall patient with > 4 METS but less than 10 - Patient is a  and participates in moderate activity - doubles tennis and able to walk several blocks without any shortness of breath, chest pain, palpitations. Does complain of mild HOFF walking up 4 flight of stairs while lifting objects.     Patient states that he may have had a stress test in the past but does not remember results, never told abnormal. Never had echocardiogram in the past. No signs of cardiac ischemia or heart failure.     Patient had several surgeries prior and states never had rxn to anesthesia.  Patient denies any family hx of reaction to anesthesia.       REVIEW OF SYSTEMS:  CONSTITUTIONAL: No weakness, fevers or chills  EYES/ENT: No visual changes;  No vertigo or throat pain   NECK: No pain or stiffness  RESPIRATORY: +HOFF walking up 4 flight of stairs, No cough, wheezing, hemoptysis; No shortness of breath  CARDIOVASCULAR: No chest pain or palpitations  GASTROINTESTINAL: No abdominal or epigastric pain. No nausea, vomiting, or hematemesis; No diarrhea or constipation. No melena or hematochezia.  GENITOURINARY: No dysuria, frequency or hematuria  NEUROLOGICAL: No numbness or weakness  SKIN: No itching, rashes    ADDITIONAL MEDICINE HPI:    REVIEW OF SYSTEMS:   Otherwise negative except as specified in HPI    PAST MEDICAL HISTORY:   Depression  OA of R hip  GERD  MAGALYS (uses CPAP)    PAST SURGICAL HISTORY:  Meniscal repar x2 of R knee   Abdominal Hernia repair  Inguinal hernia repair     FAMILY HISTORY:  FHx of CAD - father needed a bypass and  of MI in the 80s   FHx of Lung cancer - mother and  at 81     SOCIAL HISTORY:  Tobacco use: Never smoker   EtOH use: 1-2 beers/nightly   Illicit drug use: No drug use   Works as  for people with special needs, 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 1000 milliLiter(s) (80 mL/Hr) IV Continuous <Continuous>    MEDICATIONS  (PRN):  morphine  - Injectable 2 milliGRAM(s) IV Push every 4 hours PRN breakthrough pain  oxyCODONE    IR 10 milliGRAM(s) Oral every 4 hours PRN Severe Pain (7 - 10)  oxyCODONE    IR 5 milliGRAM(s) Oral every 4 hours PRN Moderate Pain (4 - 6)      ALLERGIES:  Allergies  erythromycin (Hives)    Intolerances    VITAL SIGNS:  Vital Signs Last 24 Hrs  T(C): 36.7 (31 Aug 2018 01:04), Max: 36.7 (30 Aug 2018 19:13)  T(F): 98 (31 Aug 2018 01:04), Max: 98 (30 Aug 2018 19:13)  HR: 52 (31 Aug 2018 01:04) (52 - 57)  BP: 132/72 (31 Aug 2018 01:04) (131/81 - 132/72)  BP(mean): --  RR: 14 (31 Aug 2018 01:04) (14 - 18)  SpO2: 97% (31 Aug 2018 01:04) (97% - 98%)      PHYSICAL EXAM:  Constitutional: WDWN resting comfortably in bed; NAD  Head: NC/AT  Eyes: PERRL, EOMI, mildly erythematous R sclera   ENT: no nasal discharge; uvula midline, no oropharyngeal erythema or exudates; MMM  Neck: supple; no JVD or thyromegaly  Respiratory: CTA B/L; no W/R/R, no retractions  Cardiac: +S1/S2; RRR; no murmurs   Gastrointestinal: abdomen soft, mildly distended, nontender, no rebound or guarding; hyperactive BS  Back: spine midline, no bony tenderness or step-offs; no CVAT B/L  Extremities: L lower extremity in immobilizer, sensation intact, unable to assess strength, R lower extremity 5/5 strength, sensation intact, WWP, no clubbing or cyanosis; no peripheral edema  Musculoskeletal: NROM x4; no joint swelling, tenderness or erythema  Vascular: 2+ radial, DP/PT pulses B/L  Dermatologic: skin warm, dry and intact; no rashes, wounds, or scars  Lymphatic: no submandibular or cervical LAD  Neurologic: AAOx3; CNII-XII grossly intact; no focal deficits  Psychiatric: affect and characteristics of appearance, verbalizations, behaviors are appropriate    LABS:                        15.5   11.5  )-----------( 227      ( 30 Aug 2018 22:50 )             44.7         136  |  102  |  32<H>  ----------------------------<  103<H>  4.7   |  26  |  1.18    Ca    9.4      30 Aug 2018 22:50    TPro  8.2  /  Alb  4.6  /  TBili  1.2  /  DBili  x   /  AST  71<H>  /  ALT  171<H>  /  AlkPhos  88  08-30    PT/INR - ( 30 Aug 2018 22:50 )   PT: 11.9 sec;   INR: 1.08          PTT - ( 30 Aug 2018 22:50 )  PTT:27.8 sec      RADIOLOGY & ADDITIONAL TESTS:     EKG: Sinus bradycardia, otherwise nl, no ST elevations or T wave inversions     CXR: mild venous congestions, hazy opacification in the apical regions b/l?    Per ED wet reads:   Xray femur 2v L: no fx, no dislocation, no soft tissue injury  Xray knee 3v L: no fx, calcifications proximal to patella, no dislocation

## 2018-08-31 NOTE — PHYSICAL THERAPY INITIAL EVALUATION ADULT - PERTINENT HX OF CURRENT PROBLEM, REHAB EVAL
Patient is a 63 year old M who presents with L quad tendon rupture s/p mechanical fall. Patient was carrying furniture down stairs and fell on his left knee, complained of cramping afterwards and was unable to bear weight.

## 2018-08-31 NOTE — DISCHARGE NOTE ADULT - CARE PROVIDER_API CALL
Berry Owen), [facility]  Orthopedics  [address]  [address] [place] [address]  Phone: [phone]  Fax: [phone]

## 2018-08-31 NOTE — PHYSICAL THERAPY INITIAL EVALUATION ADULT - RANGE OF MOTION EXAMINATION, REHAB EVAL
bilateral upper extremity ROM was WFL (within functional limits)/Right LE ROM was WFL (within functional limits)/deficits as listed below/LLE locked in 0 degrees extension in brace

## 2018-08-31 NOTE — PROGRESS NOTE ADULT - SUBJECTIVE AND OBJECTIVE BOX
Ortho Preop Note    Patient is a 63y old  Male who presents with a chief complaint of l quad tendon rupture (31 Aug 2018 05:56)    Diagnosis: L quad tendon rupture  Procedure: L quad tendon repair  Surgeon: Dr. Owen                          15.5   11.5  )-----------( 227      ( 30 Aug 2018 22:50 )             44.7     08-30    136  |  102  |  32<H>  ----------------------------<  103<H>  4.7   |  26  |  1.18    Ca    9.4      30 Aug 2018 22:50    TPro  8.2  /  Alb  4.6  /  TBili  1.2  /  DBili  x   /  AST  71<H>  /  ALT  171<H>  /  AlkPhos  88  08-30    PT/INR - ( 30 Aug 2018 22:50 )   PT: 11.9 sec;   INR: 1.08          PTT - ( 30 Aug 2018 22:50 )  PTT:27.8 sec      [ord] Type & Screen  [ord] CBC  [x] BMP  [ord] PT/PTT/INR  [ord] Urinalysis  [ord] Chest X-ray  [x] EKG  [x] NPO/IVF  [x] Consent  [ ] Clearance pending medicine  [x] Added on to OR Schedule  [n/a] Anti-coagulation held  [n/a] MRSA/MSSA Nasal Screen     Assessment & Plan:  63yMale with L quad tendon rupture  -For OR 8/31/18    Ortho Pager [phone]

## 2018-08-31 NOTE — PHYSICAL THERAPY INITIAL EVALUATION ADULT - IMPAIRMENTS FOUND, PT EVAL
joint integrity and mobility/gait, locomotion, and balance/aerobic capacity/endurance/muscle strength/ROM

## 2018-08-31 NOTE — CONSULT NOTE ADULT - ATTENDING COMMENTS
Patient was seen and examined by me at bedside on 8/31 at 6pm, POD-0. I agree with resident's note, subjective, objective physical exam, assessment and plan with following modifications/additions.

## 2018-08-31 NOTE — PHYSICAL THERAPY INITIAL EVALUATION ADULT - GENERAL OBSERVATIONS, REHAB EVAL
Received semi-supine in bed with +L knee sabi brace with ace wrap c/d/i, +hep lock, sister present, on room air, in no apparent distress. Patient drowsy and sedated upon arrival

## 2018-08-31 NOTE — DISCHARGE NOTE ADULT - HOSPITAL COURSE
Admitted  Med consult  Surgery -  Perioperative abx  Pain control  DVT ppx  PT consult Admitted  Med consult  Surgery - L quad tendon repair 8/31/18  Perioperative abx  Pain control  DVT ppx  PT consult Admitted for pain control, operative stabilization of Left quad rupture, inability to be discharged from ER safely due to fall risk  Med consult  Surgery - L quad tendon repair 8/31/18  Perioperative abx  Pain control  DVT ppx  PT consult - cleared for home

## 2018-08-31 NOTE — PHYSICAL THERAPY INITIAL EVALUATION ADULT - CRITERIA FOR SKILLED THERAPEUTIC INTERVENTIONS
therapy frequency/impairments found/rehab potential/functional limitations in following categories/anticipated discharge recommendation/predicted duration of therapy intervention/anticipated equipment needs at discharge/risk reduction/prevention

## 2018-08-31 NOTE — PROGRESS NOTE ADULT - SUBJECTIVE AND OBJECTIVE BOX
Ortho Post Op Check    Procedure: L quad tendon primary repair  Surgeon: Brayden     Pt worked with PT but did not clear   Pt comfortable without complaints, pain controlled  Denies CP, SOB, N/V, numbness/tingling     Vital Signs Last 24 Hrs  T(C): 36.5 (08-31-18 @ 16:30), Max: 36.5 (08-31-18 @ 16:30)  T(F): 97.7 (08-31-18 @ 16:30), Max: 97.7 (08-31-18 @ 16:30)  HR: 53 (08-31-18 @ 16:30) (52 - 56)  BP: 155/77 (08-31-18 @ 16:30) (155/77 - 177/83)  BP(mean): 114 (08-31-18 @ 14:30) (105 - 114)  RR: 18 (08-31-18 @ 16:30) (15 - 20)  SpO2: 99% (08-31-18 @ 16:30) (99% - 100%)  AVSS    General: Pt Alert and oriented, NAD  DSG C/D/I, in HKB locked in extension   Pulses: 2+ PT, DP   Sensation:  SILT s/s/sp/dp/t  Motor: EHL/FHL/TA/GS 5/5                           14.7   7.9   )-----------( 188      ( 31 Aug 2018 06:42 )             43.4   31 Aug 2018 06:42    139    |  101    |  28     ----------------------------<  103    4.4     |  26     |  1.19       TPro  8.2    /  Alb  4.6    /  TBili  1.2    /  DBili  x      /  AST  71     /  ALT  171    /  AlkPhos  88     30 Aug 2018 22:50      A/P: 63yMale s/p L quad tendon primary repair   - Stable  - Pain Control  - DVT ppx: SCDs  - Post op abx: Ancef   - PT, WBS:  WBAT in B     Ortho Pager [phone]

## 2018-08-31 NOTE — DISCHARGE NOTE ADULT - CARE PLAN
Principal Discharge DX:	Tendon rupture, nontraumatic  Goal:	improvement after surgery  Assessment and plan of treatment:	see below

## 2018-09-01 VITALS
TEMPERATURE: 98 F | HEART RATE: 64 BPM | RESPIRATION RATE: 18 BRPM | DIASTOLIC BLOOD PRESSURE: 75 MMHG | SYSTOLIC BLOOD PRESSURE: 140 MMHG | OXYGEN SATURATION: 98 %

## 2018-09-01 DIAGNOSIS — M66.9 SPONTANEOUS RUPTURE OF UNSPECIFIED TENDON: ICD-10-CM

## 2018-09-01 DIAGNOSIS — Z98.890 OTHER SPECIFIED POSTPROCEDURAL STATES: ICD-10-CM

## 2018-09-01 PROCEDURE — 99233 SBSQ HOSP IP/OBS HIGH 50: CPT

## 2018-09-01 RX ADMIN — Medication 100 MILLIGRAM(S): at 06:37

## 2018-09-01 RX ADMIN — HYDROMORPHONE HYDROCHLORIDE 0.5 MILLIGRAM(S): 2 INJECTION INTRAMUSCULAR; INTRAVENOUS; SUBCUTANEOUS at 05:00

## 2018-09-01 RX ADMIN — OXYCODONE HYDROCHLORIDE 5 MILLIGRAM(S): 5 TABLET ORAL at 04:12

## 2018-09-01 RX ADMIN — HYDROMORPHONE HYDROCHLORIDE 0.5 MILLIGRAM(S): 2 INJECTION INTRAMUSCULAR; INTRAVENOUS; SUBCUTANEOUS at 04:38

## 2018-09-01 RX ADMIN — OXYCODONE HYDROCHLORIDE 5 MILLIGRAM(S): 5 TABLET ORAL at 03:12

## 2018-09-01 RX ADMIN — Medication 1000 MILLIGRAM(S): at 00:15

## 2018-09-01 RX ADMIN — HYDROMORPHONE HYDROCHLORIDE 0.5 MILLIGRAM(S): 2 INJECTION INTRAMUSCULAR; INTRAVENOUS; SUBCUTANEOUS at 08:45

## 2018-09-01 RX ADMIN — OXYCODONE HYDROCHLORIDE 10 MILLIGRAM(S): 5 TABLET ORAL at 07:12

## 2018-09-01 RX ADMIN — OXYCODONE HYDROCHLORIDE 10 MILLIGRAM(S): 5 TABLET ORAL at 11:08

## 2018-09-01 RX ADMIN — OXYCODONE HYDROCHLORIDE 10 MILLIGRAM(S): 5 TABLET ORAL at 08:15

## 2018-09-01 RX ADMIN — HYDROMORPHONE HYDROCHLORIDE 0.5 MILLIGRAM(S): 2 INJECTION INTRAMUSCULAR; INTRAVENOUS; SUBCUTANEOUS at 08:15

## 2018-09-01 RX ADMIN — OXYCODONE HYDROCHLORIDE 10 MILLIGRAM(S): 5 TABLET ORAL at 11:30

## 2018-09-01 RX ADMIN — Medication 325 MILLIGRAM(S): at 06:37

## 2018-09-01 NOTE — PROGRESS NOTE ADULT - SUBJECTIVE AND OBJECTIVE BOX
CC: No overnight events. No complaints.   ROS negative.     Vital Signs Last 24 Hrs  T(C): 36.9 (01 Sep 2018 09:07), Max: 37.7 (31 Aug 2018 22:03)  T(F): 98.5 (01 Sep 2018 09:07), Max: 99.8 (31 Aug 2018 22:03)  HR: 64 (01 Sep 2018 09:07) (53 - 67)  BP: 140/75 (01 Sep 2018 09:07) (138/73 - 163/70)  BP(mean): --  RR: 18 (01 Sep 2018 09:07) (16 - 18)  SpO2: 98% (01 Sep 2018 09:07) (97% - 99%)    PHYSICAL EXAMINATION  * General: Not in acute distress. Awake and alert. Lying comfortably in bed.  * Head: Normocephalic, atraumatic.  * HEENT: ears no discharge, eyes PERRLA, nose no discharge, throat no exudates, normal tonsils.  * Neck: no JVD, supple.  * Lungs: Clear to auscultation, no rales, no wheezes.  * Cardio: Regular rate and rhythm, no murmurs, no rubs, no gallops. Good peripheral pulses.  * Abdomen: Soft, non-tender, non-distended, tympanic to percussion, no rebound, no guarding, no rigidity. Bowel sounds present. No suprapubic or CVA tenderness.  * : Deferred.  * Extremities: Acyanotic, no edema.  * Skin: Warm and dry.  * Neuro: Alert and oriented x 3. No focal deficits. Motor strength is 5/5 throughout. Sensation intact. Cranial nerves II-XII grossly intact.     MEDICATIONS  (STANDING):  aspirin enteric coated 325 milliGRAM(s) Oral two times a day  buPROPion XL . 150 milliGRAM(s) Oral daily  docusate sodium 100 milliGRAM(s) Oral three times a day  lactated ringers. 1000 milliLiter(s) (80 mL/Hr) IV Continuous <Continuous>  sertraline 50 milliGRAM(s) Oral daily    MEDICATIONS  (PRN):  HYDROmorphone  Injectable 0.5 milliGRAM(s) IV Push every 3 hours PRN breakthrough pain  HYDROmorphone  Injectable 0.5 milliGRAM(s) IV Push every 10 minutes PRN breakthrough pain  ondansetron Injectable 4 milliGRAM(s) IV Push every 4 hours PRN Nausea and/or Vomiting  oxyCODONE    IR 10 milliGRAM(s) Oral every 4 hours PRN Severe Pain (7 - 10)  oxyCODONE    IR 5 milliGRAM(s) Oral every 4 hours PRN Moderate Pain (4 - 6)

## 2018-09-01 NOTE — PROGRESS NOTE ADULT - SUBJECTIVE AND OBJECTIVE BOX
SUBJECTIVE: Patient seen and examined. Pt doing well o/n.  No f/c/n/v/cp/sob.     OBJECTIVE:  Vital Signs Last 24 Hrs  T(C): 37.1 (01 Sep 2018 05:00), Max: 37.7 (31 Aug 2018 22:03)  T(F): 98.8 (01 Sep 2018 05:00), Max: 99.8 (31 Aug 2018 22:03)  HR: 54 (01 Sep 2018 05:00) (42 - 67)  BP: 138/73 (01 Sep 2018 05:00) (123/79 - 185/89)  BP(mean): 114 (31 Aug 2018 14:30) (94 - 114)  RR: 17 (01 Sep 2018 05:00) (9 - 34)  SpO2: 97% (01 Sep 2018 05:00) (96% - 100%)    Affected extremity:          Dressing: clean/dry/intact            Sensation: SILT         Motor exam: 5/5 TA/GS/EHL         warm well perfused; capillary refill <3 seconds     LABS:                        14.7   7.9   )-----------( 188      ( 31 Aug 2018 06:42 )             43.4         139  |  101  |  28<H>  ----------------------------<  103<H>  4.4   |  26  |  1.19    Ca    9.6      31 Aug 2018 06:42    TPro  8.2  /  Alb  4.6  /  TBili  1.2  /  DBili  x   /  AST  71<H>  /  ALT  171<H>  /  AlkPhos  88  0830    PT/INR - ( 31 Aug 2018 06:43 )   PT: 13.1 sec;   INR: 1.18          PTT - ( 30 Aug 2018 22:50 )  PTT:27.8 sec  Urinalysis Basic - ( 31 Aug 2018 08:09 )    Color: Yellow / Appearance: Clear / S.025 / pH: x  Gluc: x / Ketone: Trace mg/dL  / Bili: Negative / Urobili: 0.2 E.U./dL   Blood: x / Protein: NEGATIVE mg/dL / Nitrite: NEGATIVE   Leuk Esterase: NEGATIVE / RBC: x / WBC x   Sq Epi: x / Non Sq Epi: x / Bacteria: x      MEDICATIONS:aspirin enteric coated 325 milliGRAM(s) Oral two times a day  buPROPion XL . 150 milliGRAM(s) Oral daily  docusate sodium 100 milliGRAM(s) Oral three times a day  HYDROmorphone  Injectable 0.5 milliGRAM(s) IV Push every 3 hours PRN  HYDROmorphone  Injectable 0.5 milliGRAM(s) IV Push every 10 minutes PRN  lactated ringers. 1000 milliLiter(s) IV Continuous <Continuous>  ondansetron Injectable 4 milliGRAM(s) IV Push every 4 hours PRN  oxyCODONE    IR 10 milliGRAM(s) Oral every 4 hours PRN  oxyCODONE    IR 5 milliGRAM(s) Oral every 4 hours PRN  sertraline 50 milliGRAM(s) Oral daily    Anticoagulation:  aspirin enteric coated 325 milliGRAM(s) Oral two times a day    Antibiotics:     Pain medications:   buPROPion XL . 150 milliGRAM(s) Oral daily  HYDROmorphone  Injectable 0.5 milliGRAM(s) IV Push every 3 hours PRN  HYDROmorphone  Injectable 0.5 milliGRAM(s) IV Push every 10 minutes PRN  ondansetron Injectable 4 milliGRAM(s) IV Push every 4 hours PRN  oxyCODONE    IR 10 milliGRAM(s) Oral every 4 hours PRN  oxyCODONE    IR 5 milliGRAM(s) Oral every 4 hours PRN  sertraline 50 milliGRAM(s) Oral daily    A/P :  Pt is a 64yo Male s/p L Quad Tendon repair  POD # 1  -    Pain control  -    DVT ppx: ASA     -    Weight bearing status: WBAT in   -    Physical Therapy  -    Dispo: Home

## 2018-09-01 NOTE — PROGRESS NOTE ADULT - ASSESSMENT
64yo M with PMHx of Depression, OA of R hip, GERD, MAGALYS (uses CPAP) presenting to the ED s/p mechanical fall. Found to have left quadriceps tendon rupture, s/p repair POD-1.    **Now medically optimized for discharge home.

## 2018-09-07 DIAGNOSIS — Z82.49 FAMILY HISTORY OF ISCHEMIC HEART DISEASE AND OTHER DISEASES OF THE CIRCULATORY SYSTEM: ICD-10-CM

## 2018-09-07 DIAGNOSIS — G89.29 OTHER CHRONIC PAIN: ICD-10-CM

## 2018-09-07 DIAGNOSIS — Z88.1 ALLERGY STATUS TO OTHER ANTIBIOTIC AGENTS STATUS: ICD-10-CM

## 2018-09-07 DIAGNOSIS — S76.112A STRAIN OF LEFT QUADRICEPS MUSCLE, FASCIA AND TENDON, INITIAL ENCOUNTER: ICD-10-CM

## 2018-09-07 DIAGNOSIS — F32.9 MAJOR DEPRESSIVE DISORDER, SINGLE EPISODE, UNSPECIFIED: ICD-10-CM
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
## 2018-09-07 DIAGNOSIS — Z99.89 DEPENDENCE ON OTHER ENABLING MACHINES AND DEVICES: ICD-10-CM
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
## 2018-09-07 DIAGNOSIS — Z80.1 FAMILY HISTORY OF MALIGNANT NEOPLASM OF TRACHEA, BRONCHUS AND LUNG: ICD-10-CM

## 2018-09-07 DIAGNOSIS — Z98.890 OTHER SPECIFIED POSTPROCEDURAL STATES: ICD-10-CM

## 2018-09-07 DIAGNOSIS — K21.9 GASTRO-ESOPHAGEAL REFLUX DISEASE WITHOUT ESOPHAGITIS: ICD-10-CM

## 2018-09-07 DIAGNOSIS — M16.11 UNILATERAL PRIMARY OSTEOARTHRITIS, RIGHT HIP: ICD-10-CM

## 2018-09-07 DIAGNOSIS — G47.33 OBSTRUCTIVE SLEEP APNEA (ADULT) (PEDIATRIC): ICD-10-CM

## 2018-09-10 ENCOUNTER — APPOINTMENT (OUTPATIENT)
Dept: ORTHOPEDIC SURGERY | Facility: CLINIC | Age: 63
End: 2018-09-10
Payer: COMMERCIAL

## 2018-09-10 VITALS — WEIGHT: 204 LBS | RESPIRATION RATE: 16 BRPM | BODY MASS INDEX: 28.88 KG/M2 | HEIGHT: 70.5 IN

## 2018-09-10 DIAGNOSIS — Z86.79 PERSONAL HISTORY OF OTHER DISEASES OF THE CIRCULATORY SYSTEM: ICD-10-CM

## 2018-09-10 DIAGNOSIS — Z80.9 FAMILY HISTORY OF MALIGNANT NEOPLASM, UNSPECIFIED: ICD-10-CM

## 2018-09-10 DIAGNOSIS — Z86.59 PERSONAL HISTORY OF OTHER MENTAL AND BEHAVIORAL DISORDERS: ICD-10-CM

## 2018-09-10 DIAGNOSIS — Z82.49 FAMILY HISTORY OF ISCHEMIC HEART DISEASE AND OTHER DISEASES OF THE CIRCULATORY SYSTEM: ICD-10-CM

## 2018-09-10 DIAGNOSIS — Z87.19 PERSONAL HISTORY OF OTHER DISEASES OF THE DIGESTIVE SYSTEM: ICD-10-CM

## 2018-09-10 PROCEDURE — 99024 POSTOP FOLLOW-UP VISIT: CPT

## 2018-09-10 RX ORDER — BUPROPION HYDROCHLORIDE 150 MG/1
150 TABLET, FILM COATED, EXTENDED RELEASE ORAL
Refills: 0 | Status: ACTIVE | COMMUNITY

## 2018-09-10 RX ORDER — FAMOTIDINE 10 MG/ML
10 INJECTION INTRAVENOUS
Qty: 0 | Refills: 0 | COMMUNITY

## 2018-09-10 RX ORDER — DOCUSATE SODIUM 100 MG
100 TABLET ORAL
Refills: 0 | Status: ACTIVE | COMMUNITY

## 2018-09-10 RX ORDER — BUPROPION HYDROCHLORIDE 150 MG/1
1 TABLET, EXTENDED RELEASE ORAL
Qty: 0 | Refills: 0 | COMMUNITY

## 2018-09-10 RX ORDER — SERTRALINE 25 MG/1
1 TABLET, FILM COATED ORAL
Qty: 0 | Refills: 0 | COMMUNITY

## 2018-09-10 RX ORDER — MELOXICAM 15 MG/1
1 TABLET ORAL
Qty: 0 | Refills: 0 | COMMUNITY

## 2018-09-10 RX ORDER — SODIUM CHLORIDE, SODIUM LACTATE, POTASSIUM CHLORIDE, CALCIUM CHLORIDE 20; 30; 600; 310 MG/100ML; MG/100ML; MG/100ML; MG/100ML
INJECTION, SOLUTION INTRAVENOUS
Refills: 0 | Status: ACTIVE | COMMUNITY

## 2018-09-10 RX ORDER — SERTRALINE HYDROCHLORIDE 50 MG/1
50 TABLET, FILM COATED ORAL
Refills: 0 | Status: ACTIVE | COMMUNITY

## 2018-09-28 ENCOUNTER — APPOINTMENT (OUTPATIENT)
Dept: ORTHOPEDIC SURGERY | Facility: CLINIC | Age: 63
End: 2018-09-28
Payer: COMMERCIAL

## 2018-09-28 VITALS — RESPIRATION RATE: 16 BRPM | HEIGHT: 70.5 IN | WEIGHT: 204 LBS | BODY MASS INDEX: 28.88 KG/M2

## 2018-09-28 PROCEDURE — 99024 POSTOP FOLLOW-UP VISIT: CPT

## 2018-09-28 RX ORDER — OXYCODONE HYDROCHLORIDE 15 MG/1
15 TABLET, FILM COATED, EXTENDED RELEASE ORAL
Refills: 0 | Status: DISCONTINUED | COMMUNITY
End: 2018-09-28

## 2018-09-28 RX ORDER — MORPHINE SULFATE 10 MG/5ML
10 SOLUTION ORAL
Refills: 0 | Status: DISCONTINUED | COMMUNITY
End: 2018-09-28

## 2018-10-29 ENCOUNTER — APPOINTMENT (OUTPATIENT)
Dept: ORTHOPEDIC SURGERY | Facility: CLINIC | Age: 63
End: 2018-10-29
Payer: COMMERCIAL

## 2018-10-29 VITALS — BODY MASS INDEX: 28.88 KG/M2 | RESPIRATION RATE: 16 BRPM | WEIGHT: 204 LBS | HEIGHT: 70.5 IN

## 2018-10-29 PROCEDURE — 99024 POSTOP FOLLOW-UP VISIT: CPT

## 2018-10-29 RX ORDER — MELOXICAM 15 MG/1
15 TABLET ORAL
Qty: 30 | Refills: 0 | Status: ACTIVE | COMMUNITY
Start: 2018-04-06

## 2018-12-16 ENCOUNTER — FORM ENCOUNTER (OUTPATIENT)
Age: 63
End: 2018-12-16

## 2018-12-17 ENCOUNTER — OUTPATIENT (OUTPATIENT)
Dept: OUTPATIENT SERVICES | Facility: HOSPITAL | Age: 63
LOS: 1 days | End: 2018-12-17
Payer: COMMERCIAL

## 2018-12-17 ENCOUNTER — APPOINTMENT (OUTPATIENT)
Dept: RADIOLOGY | Facility: CLINIC | Age: 63
End: 2018-12-17

## 2018-12-17 ENCOUNTER — APPOINTMENT (OUTPATIENT)
Dept: ORTHOPEDIC SURGERY | Facility: CLINIC | Age: 63
End: 2018-12-17
Payer: COMMERCIAL

## 2018-12-17 DIAGNOSIS — Z98.890 OTHER SPECIFIED POSTPROCEDURAL STATES: Chronic | ICD-10-CM

## 2018-12-17 PROCEDURE — 99213 OFFICE O/P EST LOW 20 MIN: CPT

## 2018-12-17 PROCEDURE — 73564 X-RAY EXAM KNEE 4 OR MORE: CPT | Mod: 26,LT

## 2018-12-17 PROCEDURE — 73564 X-RAY EXAM KNEE 4 OR MORE: CPT

## 2018-12-17 RX ORDER — CICLOPIROX OLAMINE 7.7 MG/G
0.77 CREAM TOPICAL
Qty: 90 | Refills: 0 | Status: ACTIVE | COMMUNITY
Start: 2018-04-24

## 2018-12-17 RX ORDER — BUPROPION HYDROCHLORIDE 150 MG/1
150 TABLET, EXTENDED RELEASE ORAL
Qty: 90 | Refills: 0 | Status: ACTIVE | COMMUNITY
Start: 2018-10-02

## 2019-02-11 ENCOUNTER — APPOINTMENT (OUTPATIENT)
Dept: ORTHOPEDIC SURGERY | Facility: CLINIC | Age: 64
End: 2019-02-11
Payer: COMMERCIAL

## 2019-03-06 ENCOUNTER — APPOINTMENT (OUTPATIENT)
Dept: ORTHOPEDIC SURGERY | Facility: CLINIC | Age: 64
End: 2019-03-06
Payer: COMMERCIAL

## 2019-03-06 PROCEDURE — 99213 OFFICE O/P EST LOW 20 MIN: CPT

## 2019-08-06 ENCOUNTER — FORM ENCOUNTER (OUTPATIENT)
Age: 64
End: 2019-08-06

## 2019-08-07 ENCOUNTER — APPOINTMENT (OUTPATIENT)
Dept: RADIOLOGY | Facility: CLINIC | Age: 64
End: 2019-08-07
Payer: COMMERCIAL

## 2019-08-07 ENCOUNTER — OUTPATIENT (OUTPATIENT)
Dept: OUTPATIENT SERVICES | Facility: HOSPITAL | Age: 64
LOS: 1 days | End: 2019-08-07
Payer: COMMERCIAL

## 2019-08-07 ENCOUNTER — APPOINTMENT (OUTPATIENT)
Dept: ORTHOPEDIC SURGERY | Facility: CLINIC | Age: 64
End: 2019-08-07
Payer: COMMERCIAL

## 2019-08-07 VITALS — BODY MASS INDEX: 28.88 KG/M2 | HEIGHT: 70.5 IN | WEIGHT: 204 LBS | RESPIRATION RATE: 16 BRPM

## 2019-08-07 DIAGNOSIS — M20.12 HALLUX VALGUS (ACQUIRED), LEFT FOOT: ICD-10-CM

## 2019-08-07 DIAGNOSIS — Z98.890 OTHER SPECIFIED POSTPROCEDURAL STATES: Chronic | ICD-10-CM

## 2019-08-07 DIAGNOSIS — M62.559 MUSCLE WASTING AND ATROPHY, NOT ELSEWHERE CLASSIFIED, UNSPECIFIED THIGH: ICD-10-CM

## 2019-08-07 DIAGNOSIS — M76.31 ILIOTIBIAL BAND SYNDROME, RIGHT LEG: ICD-10-CM

## 2019-08-07 DIAGNOSIS — S76.112A STRAIN OF LEFT QUADRICEPS MUSCLE, FASCIA AND TENDON, INITIAL ENCOUNTER: ICD-10-CM

## 2019-08-07 PROCEDURE — 99214 OFFICE O/P EST MOD 30 MIN: CPT

## 2019-08-07 PROCEDURE — 73630 X-RAY EXAM OF FOOT: CPT

## 2019-08-07 PROCEDURE — 73630 X-RAY EXAM OF FOOT: CPT | Mod: 26,LT

## 2019-08-07 RX ORDER — CHOLECALCIFEROL (VITAMIN D3) 1250 MCG
1.25 MG CAPSULE ORAL
Qty: 4 | Refills: 0 | Status: ACTIVE | COMMUNITY
Start: 2019-02-13

## 2019-08-07 RX ORDER — AZITHROMYCIN 250 MG/1
250 TABLET, FILM COATED ORAL
Qty: 6 | Refills: 0 | Status: ACTIVE | COMMUNITY
Start: 2019-05-01

## 2019-08-07 RX ORDER — METHYLPHENIDATE HYDROCHLORIDE 36 MG/1
36 TABLET, EXTENDED RELEASE ORAL
Qty: 30 | Refills: 0 | Status: ACTIVE | COMMUNITY
Start: 2019-04-23

## 2019-08-07 RX ORDER — BUPROPION HYDROCHLORIDE 300 MG/1
300 TABLET, EXTENDED RELEASE ORAL
Qty: 30 | Refills: 0 | Status: ACTIVE | COMMUNITY
Start: 2019-07-31

## 2019-08-07 RX ORDER — METHYLPHENIDATE HYDROCHLORIDE 27 MG/1
27 TABLET, EXTENDED RELEASE ORAL
Qty: 30 | Refills: 0 | Status: ACTIVE | COMMUNITY
Start: 2019-03-26

## 2019-09-23 PROBLEM — M20.12 ACQUIRED HALLUX VALGUS OF LEFT FOOT: Status: ACTIVE | Noted: 2019-08-07

## 2019-09-23 NOTE — HISTORY OF PRESENT ILLNESS
[FreeTextEntry1] : DOS: 08/31/2018\par \par Micheal who is a 64 y/o F returns almost 1 year s/p left quadriceps repair. He reports that he is doing very well.  Pt has no new complaints with the left extremity, operative extremity. He adds that he is having new onset of pain in the right about his right quad.  He denies any activity induced pain.  Is also complaining of left foot pain

## 2019-09-23 NOTE — REVIEW OF SYSTEMS
[FreeTextEntry9] : Review of Systems no feelings of weakness \par All other review of systems are negative except as noted. \par Constitutional: no chills, not feeling tired and no fever. \par Eyes: no discharge, no pain and no redness. \par ENT: no nasal discharge, no nosebleeds and no sore throat. \par Cardiovascular: no chest pain, no palpitations and the heart rate was not fast. \par Respiratory: no shortness of breath, no cough and no wheezing. \par Gastrointestinal: no abdominal pain, no diarrhea, no vomiting and no melena. \par Genitourinary: no pelvic pain, no dysuria, no abnormal urethral discharge and no frequency. \par Integumentary: no skin lesions and no change in a mole. \par Neurological: no dizziness, no fainting and no convulsions. \par Endocrine: no deepening of the voice and no hot flashes. \par Hematologic/Lymphatic: does not bleed easily, no swollen glands and no cervical lymphadenopathy.\par Musculoskeletal: [as per HPI, otherwise denies additional joint pain, effusions, stiffness.]\par \par Please refer to the attached intake form for additional history and details.

## 2019-09-23 NOTE — PHYSICAL EXAM
[de-identified] : No new images were obtained today.\par  [de-identified] : Kingsley is doing very well. \par \par General: Patient is awake and alert, demonstrates appropriate mood and affect, exhibits normal breathing and is in no acute distress.\par Psych: The patient is appropriately dressed and groomed, maintains good eye contact. Alert and oriented x 3. Normal attention/concentration\par Skin: The patient has no chronic skin lesions, rashes, or ulcers. There is no induration or erythema of uninvolved extremities. For skin exam of involved extremity refer to detailed musculoskeletal/extremity exam. \par Respiratory: The patient is in no apparent respiratory distress. They're taking full deep breaths with normal excursion, without use of accessory muscles or evidence of audible wheezes or stridor without the use of a stethoscope. \par Neurological: 5/5 motor strength and sensation intact throughout uninvolved upper and lower extremities, refer to detailed musculoskeletal exam regarding involved extremity.\par \par Right knee\par Tender palpation over the iliotibial band\par No snapping\par No medial or lateral joint line tenderness\par Mild to moderate atrophy of the quadriceps\par No defect in the quadriceps tendon\par Able to maintain a straight leg raise against resistance\par No instability about the knee to varus valgus anterior posterior drawer\par \par Left knee\par There improvement in the atrophy of the quadriceps muscle on the injury side compared to the contralateral side.\par Slightly decreased strength in knee extension compared to the contralateral side while sitting.\par No tenderness to palpation about the surgical site.\par Full range of motion of the left knee without mechanical symptoms.\par Able to flex to 120° in the office\par No leg lag with knee extension\par Able to perform a single limb squat without discomfort\par Appearance: Skin is intact. There is no skin breakdown. There were no lesions. There is no erythema.\par Vascular: The patient has a warm and well-perfused foot. There is a palpable 2+ dorsalis pedis and posterior tibialis artery. There is brisk capillary refill to all 5 digits.\par Sensation intact to light touch in saphenous, sural, superficial peroneal, deep peroneal, and tibial nerve distributions. \par Motors: Patient is able to fire extensor hallucis longus, flexor hallucis longus, tibialis anterior, gastrocsoleus complex. The patient has 5 out of 5 strength in all myotomes.\par Lymph: No evidence of venous stasis ulcers. No lymphedema. No pitting edema. \par \par Left foot\par Moderate to severe hallux valgus\par Erythematous swollen dorsal medial eminence\par Negative hallux grind\par Minimal tenderness to palpation about his bunion\par Windswept lesser digits laterally\par Able to single limb heel rise without significant discomfort\par Appearance: Skin intact. No skin breakdown. No lesions. No erythema. \par Vascular: Warm well-perfused foot. Palpable 2+ dorsalis pedis and posterior tibialis artery. + Brisk capillary refill to all 5 digits. \par Neuro: sensation intact to light touch in saphenous, sural, superficial peroneal, deep peroneal, and tibial nerve distributions \par Motor: + Fire extensor hallucis longus, flexor hallucis longus, tibilias anterior, gastrocsoleus. 5/5 strength in all myotomes \par Lymph: No evidence of venous stasis ulcers. No lymphedema. No pitting edema\par \par

## 2019-09-23 NOTE — DISCUSSION/SUMMARY
[de-identified] : 8.7.2019\par 64-year-old male almost 1 year s/p left quadriceps tendon repair. He is doing very well.  He reported right thigh pain consistent with iliotibial band syndrome.  I encouraged the patient to perform gluteal strengthening as well as hamstring stretching to treat his right hip and thigh pain.  The patient also complained of left foot pain.  These were reviewed today which show metatarsus abductus, moderate incongruent hallux valgus, incongruency of the second metatarsal phalangeal joint, arthrosis of the first tarsometatarsal joint and mild arthrosis of the first metatarsal phalangeal joint\par At this point the patient is accommodating shoe wear without severe pain about his left foot.  We will initiate conservative treatment including wide toe box shoes for his left bunion.\par 1.  Follow up PRN in 2-3 months. No new images needed at follow up.\par 2. WBAT\par 3. C/w physical therapy for quadriceps stretching and strenghening\par **Pt was given Dr. Connor Castillo info to follow up with for his right hip pain.
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
## 2023-04-20 NOTE — PRE-OP CHECKLIST - NOTHING BY MOUTH SINCE
April 20, 2023    Hello, may I speak with Fredy Stallworth?    My name is NORY    I am  with [facility]  [address] [place] [address].    Before we get started may I verify your date of birth? 1989    I am calling to officially welcome you to our practice and ask about your recent visit. Is this a good time to talk? yes    Tell me about your visit with us. What things went well?  Everything went great       We're always looking for ways to make our patients' experiences even better. Do you have recommendations on ways we may improve?  no    Overall were you satisfied with your first visit to our practice? yes       I appreciate you taking the time to speak with me today. Is there anything else I can do for you? no      Thank you, and have a great day.      
31-Aug-2018 00:00